# Patient Record
Sex: MALE | Race: BLACK OR AFRICAN AMERICAN | NOT HISPANIC OR LATINO | URBAN - METROPOLITAN AREA
[De-identification: names, ages, dates, MRNs, and addresses within clinical notes are randomized per-mention and may not be internally consistent; named-entity substitution may affect disease eponyms.]

---

## 2021-06-17 ENCOUNTER — LAB OUTSIDE AN ENCOUNTER (OUTPATIENT)
Dept: URBAN - METROPOLITAN AREA CLINIC 118 | Facility: CLINIC | Age: 51
End: 2021-06-17

## 2021-06-17 ENCOUNTER — OFFICE VISIT (OUTPATIENT)
Dept: URBAN - METROPOLITAN AREA CLINIC 118 | Facility: CLINIC | Age: 51
End: 2021-06-17
Payer: MEDICARE

## 2021-06-17 DIAGNOSIS — Z12.11 COLON CANCER SCREENING: ICD-10-CM

## 2021-06-17 PROCEDURE — G9903 PT SCRN TBCO ID AS NON USER: HCPCS | Performed by: INTERNAL MEDICINE

## 2021-06-17 PROCEDURE — 3017F COLORECTAL CA SCREEN DOC REV: CPT | Performed by: INTERNAL MEDICINE

## 2021-06-17 PROCEDURE — 99202 OFFICE O/P NEW SF 15 MIN: CPT | Performed by: INTERNAL MEDICINE

## 2021-06-17 PROCEDURE — G8427 DOCREV CUR MEDS BY ELIG CLIN: HCPCS | Performed by: INTERNAL MEDICINE

## 2021-06-17 NOTE — HPI-TODAY'S VISIT:
50 yo male with pmh of ESRD s/p kidney transplant here for evaluation for colonoscopy. Denies any GI complaints including abdominal pain, rectal bleeding, constipation, diarrhea. No prior colonoscopy.

## 2021-07-08 ENCOUNTER — OFFICE VISIT (OUTPATIENT)
Dept: URBAN - METROPOLITAN AREA SURGERY CENTER 23 | Facility: SURGERY CENTER | Age: 51
End: 2021-07-08
Payer: MEDICARE

## 2021-07-08 DIAGNOSIS — K63.5 BENIGN COLON POLYP: ICD-10-CM

## 2021-07-08 DIAGNOSIS — Z12.11 COLON CANCER SCREENING: ICD-10-CM

## 2021-07-08 PROCEDURE — G8907 PT DOC NO EVENTS ON DISCHARG: HCPCS | Performed by: INTERNAL MEDICINE

## 2021-07-08 PROCEDURE — 45380 COLONOSCOPY AND BIOPSY: CPT | Performed by: INTERNAL MEDICINE

## 2023-09-12 ENCOUNTER — CLAIMS CREATED FROM THE CLAIM WINDOW (OUTPATIENT)
Dept: URBAN - METROPOLITAN AREA MEDICAL CENTER 33 | Facility: MEDICAL CENTER | Age: 53
End: 2023-09-12
Payer: MEDICARE

## 2023-09-12 DIAGNOSIS — R79.89 ABNORMAL BILIRUBIN TEST: ICD-10-CM

## 2023-09-12 DIAGNOSIS — K83.8 OTHER SPECIFIED DISEASES OF BILIARY TRACT: ICD-10-CM

## 2023-09-12 DIAGNOSIS — R74.01 ABNORMAL/ELEVATED TRANSAMINASE (SGOT, AMINOTRANSFERASE): ICD-10-CM

## 2023-09-12 DIAGNOSIS — K82.8 ADENOMYOSIS OF GALLBLADDER: ICD-10-CM

## 2023-09-12 PROCEDURE — 99254 IP/OBS CNSLTJ NEW/EST MOD 60: CPT | Performed by: PHYSICIAN ASSISTANT

## 2023-09-12 PROCEDURE — 99254 IP/OBS CNSLTJ NEW/EST MOD 60: CPT | Performed by: INTERNAL MEDICINE

## 2023-09-12 PROCEDURE — G8427 DOCREV CUR MEDS BY ELIG CLIN: HCPCS | Performed by: INTERNAL MEDICINE

## 2023-09-12 PROCEDURE — 99222 1ST HOSP IP/OBS MODERATE 55: CPT | Performed by: INTERNAL MEDICINE

## 2023-09-13 ENCOUNTER — CLAIMS CREATED FROM THE CLAIM WINDOW (OUTPATIENT)
Dept: URBAN - METROPOLITAN AREA MEDICAL CENTER 33 | Facility: MEDICAL CENTER | Age: 53
End: 2023-09-13
Payer: MEDICARE

## 2023-09-13 DIAGNOSIS — K83.8 OTHER SPECIFIED DISEASES OF BILIARY TRACT: ICD-10-CM

## 2023-09-13 DIAGNOSIS — K82.8 ADENOMYOSIS OF GALLBLADDER: ICD-10-CM

## 2023-09-13 DIAGNOSIS — R79.89 ABNORMAL BILIRUBIN TEST: ICD-10-CM

## 2023-09-13 PROCEDURE — 99232 SBSQ HOSP IP/OBS MODERATE 35: CPT | Performed by: PHYSICIAN ASSISTANT

## 2023-09-13 PROCEDURE — 99232 SBSQ HOSP IP/OBS MODERATE 35: CPT | Performed by: INTERNAL MEDICINE

## 2023-09-14 ENCOUNTER — CLAIMS CREATED FROM THE CLAIM WINDOW (OUTPATIENT)
Dept: URBAN - METROPOLITAN AREA MEDICAL CENTER 33 | Facility: MEDICAL CENTER | Age: 53
End: 2023-09-14
Payer: MEDICARE

## 2023-09-14 DIAGNOSIS — R79.89 ABNORMAL BILIRUBIN TEST: ICD-10-CM

## 2023-09-14 DIAGNOSIS — R74.8 ABNORMAL ALKALINE PHOSPHATASE TEST: ICD-10-CM

## 2023-09-14 DIAGNOSIS — K83.8 OTHER SPECIFIED DISEASES OF BILIARY TRACT: ICD-10-CM

## 2023-09-14 DIAGNOSIS — K82.8 ADENOMYOSIS OF GALLBLADDER: ICD-10-CM

## 2023-09-14 PROCEDURE — 99233 SBSQ HOSP IP/OBS HIGH 50: CPT | Performed by: PHYSICIAN ASSISTANT

## 2023-09-14 PROCEDURE — 99233 SBSQ HOSP IP/OBS HIGH 50: CPT | Performed by: INTERNAL MEDICINE

## 2023-10-25 ENCOUNTER — LAB OUTSIDE AN ENCOUNTER (OUTPATIENT)
Dept: URBAN - METROPOLITAN AREA CLINIC 118 | Facility: CLINIC | Age: 53
End: 2023-10-25

## 2023-10-25 ENCOUNTER — OFFICE VISIT (OUTPATIENT)
Dept: URBAN - METROPOLITAN AREA CLINIC 118 | Facility: CLINIC | Age: 53
End: 2023-10-25
Payer: MEDICARE

## 2023-10-25 VITALS
TEMPERATURE: 98.6 F | SYSTOLIC BLOOD PRESSURE: 119 MMHG | HEART RATE: 81 BPM | DIASTOLIC BLOOD PRESSURE: 74 MMHG | WEIGHT: 176.8 LBS | HEIGHT: 71 IN

## 2023-10-25 DIAGNOSIS — D64.89 OTHER SPECIFIED ANEMIAS: ICD-10-CM

## 2023-10-25 DIAGNOSIS — R19.7 DIARRHEA OF PRESUMED INFECTIOUS ORIGIN: ICD-10-CM

## 2023-10-25 DIAGNOSIS — R17 ELEVATED BILIRUBIN: ICD-10-CM

## 2023-10-25 DIAGNOSIS — K62.5 RECTAL BLEEDING: ICD-10-CM

## 2023-10-25 PROCEDURE — 99214 OFFICE O/P EST MOD 30 MIN: CPT | Performed by: INTERNAL MEDICINE

## 2023-10-25 RX ORDER — PANTOPRAZOLE SODIUM 40 MG/1
1 TABLET TABLET, DELAYED RELEASE ORAL ONCE A DAY
Status: ACTIVE | COMMUNITY

## 2023-10-25 RX ORDER — ATORVASTATIN CALCIUM 10 MG/1
1 TABLET TABLET, FILM COATED ORAL ONCE A DAY
Status: ACTIVE | COMMUNITY

## 2023-10-25 RX ORDER — MYCOPHENOLATE MOFETIL 250 MG/1
1 CAPSULE CAPSULE ORAL TWICE A DAY
Status: ACTIVE | COMMUNITY

## 2023-10-25 RX ORDER — TACROLIMUS 1 MG/1
AS DIRECTED CAPSULE ORAL
Status: ACTIVE | COMMUNITY

## 2023-10-25 RX ORDER — METOPROLOL TARTRATE 25 MG/1
1 TABLET WITH FOOD TABLET, FILM COATED ORAL TWICE A DAY
Status: ACTIVE | COMMUNITY

## 2023-10-25 NOTE — PHYSICAL EXAM CONSTITUTIONAL:
HOB increased to semi-santa's position at this time. No complications noted to site and distal pulses remain palpable. Pt denies all complaints at this time. See flowsheets for further information. Writer will continue to monitor. normal, in no acute distress,  well developed, well nourished,  ambulating without difficulty,  normal communication ability , alert

## 2023-10-25 NOTE — HPI-TODAY'S VISIT:
This is a 52 yo male with pmh of ESRD s/p kidney transplant here for a hospital follow up visit.  Patient had recent admission in 9/2023 at [facility] for LUIS CARLOS and sepsis. Patient reports having medication interaction with COVID medication and his immunesuppressant. Ended up having to restart HD during admission. Also seen by GI service for elevated liver enzymes. work up including MRCP and assessment was that jaundice due to sepsis.   Patient had elevation liver enzymes mainly with T. bili at 13.8 with direct 8.2 which improved during the admission.  Patient had acute renal failure likely secondary to tacrolimus toxicity.  Patient has previous history of RCC status post right nephrectomy kidney transplant.Patient reports persistent diarrhea symptoms with multiple loose stools.  Has intermittent blood in his stool.  Also reports having [facility] this [facility].  Records not available at this time.  Of note patient had colonoscopy with me in 2021 showing one 4 mm descending colon polyp removed. Patient also has history of prostate cancer status post prostatectomy.   MRCP  IMPRESSION:1. Status post right nephrectomy with no masses or fluid collections inthe nephrectomy bed. Atrophic left kidney. Partially imaged right lowerquadrant transplant kidney grossly unremarkable.2. Sludge and/or small stones within the gallbladder. No specific MRevidence for cholecystitis. No choledocholithiasis demonstrated. No etiology for jaundice identified.

## 2023-10-30 LAB
% SATURATION: 33
ALBUMIN/GLOBULIN RATIO: 1.2
ALBUMIN: 4.2
ALKALINE PHOSPHATASE: 79
ALT (SGPT): 7
AST (SGOT): 12
BILIRUBIN, DIRECT: 0.6
BILIRUBIN, INDIRECT: 1.1
BILIRUBIN, TOTAL: 1.7
FERRITIN: 1366
GLOBULIN: 3.4
IRON BINDING CAPACITY: 282
IRON, TOTAL: 92
MITOCHONDRIAL AB SCREEN: NEGATIVE
PROTEIN, TOTAL: 7.6

## 2023-11-05 LAB
CALPROTECTIN, FECAL: 200
CAMPYLOBACTER GROUP: NOT DETECTED
NOROVIRUS GI/GII: NOT DETECTED
ROTAVIRUS A: NOT DETECTED
SALMONELLA SPECIES: NOT DETECTED
SHIGA TOXIN 1: NOT DETECTED
SHIGA TOXIN 2: NOT DETECTED
SHIGELLA SPECIES: NOT DETECTED
VIBRIO GROUP: NOT DETECTED
YERSINIA ENTEROCOLITICA: NOT DETECTED

## 2024-01-18 ENCOUNTER — OFFICE VISIT (OUTPATIENT)
Dept: URBAN - METROPOLITAN AREA MEDICAL CENTER 16 | Facility: MEDICAL CENTER | Age: 54
End: 2024-01-18
Payer: MEDICARE

## 2024-01-18 DIAGNOSIS — D64.89 ANEMIA DUE TO OTHER CAUSE: ICD-10-CM

## 2024-01-18 DIAGNOSIS — R19.7 ACUTE DIARRHEA: ICD-10-CM

## 2024-01-18 DIAGNOSIS — K31.89 ACHYLIA: ICD-10-CM

## 2024-01-18 DIAGNOSIS — K20.80 ABSCESS OF ESOPHAGUS: ICD-10-CM

## 2024-01-18 PROCEDURE — 43239 EGD BIOPSY SINGLE/MULTIPLE: CPT | Performed by: INTERNAL MEDICINE

## 2024-01-18 RX ORDER — MYCOPHENOLATE MOFETIL 250 MG/1
1 CAPSULE CAPSULE ORAL TWICE A DAY
Status: ACTIVE | COMMUNITY

## 2024-01-18 RX ORDER — PANTOPRAZOLE SODIUM 40 MG/1
1 TABLET TABLET, DELAYED RELEASE ORAL ONCE A DAY
Status: ACTIVE | COMMUNITY

## 2024-01-18 RX ORDER — METOPROLOL TARTRATE 25 MG/1
1 TABLET WITH FOOD TABLET, FILM COATED ORAL TWICE A DAY
Status: ACTIVE | COMMUNITY

## 2024-01-18 RX ORDER — TACROLIMUS 1 MG/1
AS DIRECTED CAPSULE ORAL
Status: ACTIVE | COMMUNITY

## 2024-01-18 RX ORDER — ATORVASTATIN CALCIUM 10 MG/1
1 TABLET TABLET, FILM COATED ORAL ONCE A DAY
Status: ACTIVE | COMMUNITY

## 2024-02-14 ENCOUNTER — OV EP (OUTPATIENT)
Dept: URBAN - METROPOLITAN AREA CLINIC 118 | Facility: CLINIC | Age: 54
End: 2024-02-14
Payer: MEDICARE

## 2024-02-14 VITALS
WEIGHT: 187.8 LBS | BODY MASS INDEX: 26.29 KG/M2 | HEART RATE: 76 BPM | HEIGHT: 71 IN | SYSTOLIC BLOOD PRESSURE: 128 MMHG | DIASTOLIC BLOOD PRESSURE: 76 MMHG | TEMPERATURE: 98.1 F

## 2024-02-14 DIAGNOSIS — K62.5 RECTAL BLEEDING: ICD-10-CM

## 2024-02-14 DIAGNOSIS — K21.9 ACID REFLUX: ICD-10-CM

## 2024-02-14 DIAGNOSIS — R19.7 ACUTE DIARRHEA: ICD-10-CM

## 2024-02-14 DIAGNOSIS — R17 ELEVATED BILIRUBIN: ICD-10-CM

## 2024-02-14 PROBLEM — 266433003: Status: ACTIVE | Noted: 2024-02-14

## 2024-02-14 PROCEDURE — 99214 OFFICE O/P EST MOD 30 MIN: CPT | Performed by: INTERNAL MEDICINE

## 2024-02-14 RX ORDER — METOPROLOL TARTRATE 25 MG/1
1 TABLET WITH FOOD TABLET, FILM COATED ORAL TWICE A DAY
Status: ACTIVE | COMMUNITY

## 2024-02-14 RX ORDER — PANTOPRAZOLE SODIUM 40 MG/1
1 TABLET TABLET, DELAYED RELEASE ORAL ONCE A DAY
Status: ACTIVE | COMMUNITY

## 2024-02-14 RX ORDER — ALLOPURINOL 100 MG/1
TABLET ORAL
Qty: 45 TABLET | Status: ACTIVE | COMMUNITY

## 2024-02-14 RX ORDER — ATORVASTATIN CALCIUM 10 MG/1
1 TABLET TABLET, FILM COATED ORAL ONCE A DAY
Status: ACTIVE | COMMUNITY

## 2024-02-14 RX ORDER — MYCOPHENOLATE MOFETIL 250 MG/1
1 CAPSULE CAPSULE ORAL TWICE A DAY
Status: ACTIVE | COMMUNITY

## 2024-02-14 RX ORDER — TACROLIMUS 1 MG/1
AS DIRECTED CAPSULE ORAL
Status: ACTIVE | COMMUNITY

## 2024-02-14 RX ORDER — PANTOPRAZOLE SODIUM 40 MG/1
1 TABLET TABLET, DELAYED RELEASE ORAL ONCE A DAY
Qty: 30 TABLET | Refills: 1

## 2024-02-14 RX ORDER — MAGNESIUM OXIDE 420 MG/1
TABLET ORAL
Qty: 400 TABLET | Status: ACTIVE | COMMUNITY

## 2024-02-14 NOTE — HPI-TODAY'S VISIT:
This is a 54-year-old male with history of ESRD status post kidney transplant here for follow-up visit.  Since last visit patient had EGD which showed mild LA grade a esophagitis at GE junction and gastritis.  Biopsy showed reactive gastropathy negative for H. pylori, junctional type mucosa with acute esophagitis.Patient reports persistent intermittent diarrhea symptoms with occasional fecal urgency but no GI bleeding symptoms.  States that he had [facility] in [facility].  Has not sure of the results or have any records at this time.Patient reports persistent acid reflux symptoms.  Takes pantoprazole as needed.   FINDINGS: 1.	There was mild LA grade A esophagitis at the GE junction.  Biopsies obtained.  Otherwise unremarkable esophagus exam.2.	Mild erythematous mucosa in the antrum and body along with a few nonbleeding superficial erosions in the antrum.  Biopsies obtained.3.	Normal duodenal exam.  Biopsies obtained.  ------------------------------ LOV 10/203 ----------------------------------------------  This is a 54 yo male with pmh of ESRD s/p kidney transplant here for a hospital follow up visit.  Patient had recent admission in 9/2023 at [facility] for LUIS CARLOS and sepsis. Patient reports having medication interaction with COVID medication and his immunesuppressant. Ended up having to restart HD during admission. Also seen by GI service for elevated liver enzymes. work up including MRCP and assessment was that jaundice due to sepsis.   Patient had elevation liver enzymes mainly with T. bili at 13.8 with direct 8.2 which improved during the admission.  Patient had acute renal failure likely secondary to tacrolimus toxicity.  Patient has previous history of RCC status post right nephrectomy kidney transplant.Patient reports persistent diarrhea symptoms with multiple loose stools.  Has intermittent blood in his stool.  Also reports having [facility] in [facility].  Records not available at this time.  Of note patient had colonoscopy with me in 2021 showing one 4 mm descending colon polyp removed. Patient also has history of prostate cancer status post prostatectomy.   MRCP  IMPRESSION:1. Status post right nephrectomy with no masses or fluid collections inthe nephrectomy bed. Atrophic left kidney. Partially imaged right lowerquadrant transplant kidney grossly unremarkable.2. Sludge and/or small stones within the gallbladder. No specific MRevidence for cholecystitis. No choledocholithiasis demonstrated. No etiology for jaundice identified.

## 2024-05-21 ENCOUNTER — DASHBOARD ENCOUNTERS (OUTPATIENT)
Age: 54
End: 2024-05-21

## 2024-05-21 ENCOUNTER — OFFICE VISIT (OUTPATIENT)
Dept: URBAN - METROPOLITAN AREA CLINIC 118 | Facility: CLINIC | Age: 54
End: 2024-05-21

## 2024-05-21 VITALS
WEIGHT: 192.6 LBS | DIASTOLIC BLOOD PRESSURE: 74 MMHG | SYSTOLIC BLOOD PRESSURE: 125 MMHG | BODY MASS INDEX: 26.96 KG/M2 | HEART RATE: 66 BPM | TEMPERATURE: 97.3 F | HEIGHT: 71 IN

## 2024-05-21 RX ORDER — MYCOPHENOLATE MOFETIL 250 MG/1
1 CAPSULE CAPSULE ORAL TWICE A DAY
Status: ACTIVE | COMMUNITY

## 2024-05-21 RX ORDER — METOPROLOL TARTRATE 25 MG/1
1 TABLET WITH FOOD TABLET, FILM COATED ORAL TWICE A DAY
Status: ACTIVE | COMMUNITY

## 2024-05-21 RX ORDER — ALLOPURINOL 100 MG/1
TABLET ORAL
Qty: 45 TABLET | Status: ACTIVE | COMMUNITY

## 2024-05-21 RX ORDER — MAGNESIUM OXIDE 420 MG/1
TABLET ORAL
Qty: 400 TABLET | Status: ACTIVE | COMMUNITY

## 2024-05-21 RX ORDER — ATORVASTATIN CALCIUM 10 MG/1
1 TABLET TABLET, FILM COATED ORAL ONCE A DAY
Status: ACTIVE | COMMUNITY

## 2024-05-21 RX ORDER — PANTOPRAZOLE SODIUM 40 MG/1
TAKE 1 TABLET BY MOUTH EVERY DAY TABLET, DELAYED RELEASE ORAL
Qty: 90 TABLET | Refills: 0 | Status: ACTIVE | COMMUNITY

## 2024-05-21 RX ORDER — TACROLIMUS 1 MG/1
AS DIRECTED CAPSULE ORAL
Status: ACTIVE | COMMUNITY

## 2024-06-06 ENCOUNTER — TELEPHONE ENCOUNTER (OUTPATIENT)
Dept: URBAN - METROPOLITAN AREA CLINIC 109 | Facility: CLINIC | Age: 54
End: 2024-06-06

## 2024-08-13 ENCOUNTER — OFFICE VISIT (OUTPATIENT)
Dept: URBAN - METROPOLITAN AREA CLINIC 118 | Facility: CLINIC | Age: 54
End: 2024-08-13

## 2024-08-14 ENCOUNTER — OFFICE VISIT (OUTPATIENT)
Dept: URBAN - METROPOLITAN AREA CLINIC 118 | Facility: CLINIC | Age: 54
End: 2024-08-14
Payer: MEDICARE

## 2024-08-14 VITALS
HEIGHT: 71 IN | BODY MASS INDEX: 26.6 KG/M2 | HEART RATE: 76 BPM | DIASTOLIC BLOOD PRESSURE: 75 MMHG | TEMPERATURE: 97 F | SYSTOLIC BLOOD PRESSURE: 129 MMHG | WEIGHT: 190 LBS

## 2024-08-14 DIAGNOSIS — K21.00 GASTROESOPHAGEAL REFLUX DISEASE WITH ESOPHAGITIS WITHOUT HEMORRHAGE: ICD-10-CM

## 2024-08-14 DIAGNOSIS — R19.7 DIARRHEA OF PRESUMED INFECTIOUS ORIGIN: ICD-10-CM

## 2024-08-14 PROCEDURE — 99213 OFFICE O/P EST LOW 20 MIN: CPT

## 2024-08-14 RX ORDER — MYCOPHENOLATE MOFETIL 250 MG/1
1 CAPSULE CAPSULE ORAL TWICE A DAY
Status: ACTIVE | COMMUNITY

## 2024-08-14 RX ORDER — ALLOPURINOL 100 MG/1
TABLET ORAL
Qty: 45 TABLET | Status: ACTIVE | COMMUNITY

## 2024-08-14 RX ORDER — ATORVASTATIN CALCIUM 10 MG/1
1 TABLET TABLET, FILM COATED ORAL ONCE A DAY
Status: ACTIVE | COMMUNITY

## 2024-08-14 RX ORDER — METOPROLOL TARTRATE 25 MG/1
1 TABLET WITH FOOD TABLET, FILM COATED ORAL TWICE A DAY
Status: ACTIVE | COMMUNITY

## 2024-08-14 RX ORDER — TACROLIMUS 1 MG/1
AS DIRECTED CAPSULE ORAL
Status: ACTIVE | COMMUNITY

## 2024-08-14 RX ORDER — PANTOPRAZOLE SODIUM 40 MG/1
1 TABLET TABLET, DELAYED RELEASE ORAL ONCE A DAY
Qty: 30 TABLET | Refills: 2

## 2024-08-14 RX ORDER — PANTOPRAZOLE SODIUM 40 MG/1
1 TABLET TABLET, DELAYED RELEASE ORAL ONCE A DAY
Qty: 30 TABLET | Refills: 2 | Status: ACTIVE | COMMUNITY

## 2024-08-14 NOTE — HPI-TODAY'S VISIT:
Patient is a 55 y/o M with PMH of kidney transplant, GERD, HLD, HTN, who presents for F/U of reflux and diarrhea. Reports significant improvement of symptoms. Formed BM 1-3x/day depending on what he eats. No longer taking Imodium. Denies diarrhea, abd pain, N/V, hematochezia, melena, unintentional wt loss. No NSAID use.  Also reports occasional reflux 2x/week. No other UGI sx. Taking PPI prn. Per patient, last [facility]. Unable to obtain copy of report.  On file last colonoscopy (2021) showed 4mm benign polyp in DC. Otherwise nml. 1/2024 Last EGD bxs- reactive gastropathy in gastric antrum, acute esophagitis at GEJ, gastric mucosa with chronic inflammation.

## 2024-11-15 ENCOUNTER — OFFICE VISIT (OUTPATIENT)
Dept: URBAN - METROPOLITAN AREA CLINIC 118 | Facility: CLINIC | Age: 54
End: 2024-11-15
Payer: MEDICARE

## 2024-11-15 VITALS
BODY MASS INDEX: 27.52 KG/M2 | DIASTOLIC BLOOD PRESSURE: 82 MMHG | TEMPERATURE: 97.3 F | SYSTOLIC BLOOD PRESSURE: 126 MMHG | HEART RATE: 74 BPM | WEIGHT: 196.6 LBS | HEIGHT: 71 IN

## 2024-11-15 DIAGNOSIS — K21.00 GASTROESOPHAGEAL REFLUX DISEASE WITH ESOPHAGITIS WITHOUT HEMORRHAGE: ICD-10-CM

## 2024-11-15 PROCEDURE — 99213 OFFICE O/P EST LOW 20 MIN: CPT

## 2024-11-15 RX ORDER — PANTOPRAZOLE SODIUM 40 MG/1
1 TABLET TABLET, DELAYED RELEASE ORAL ONCE A DAY
Qty: 30 TABLET | Refills: 2

## 2024-11-15 RX ORDER — ATORVASTATIN CALCIUM 10 MG/1
1 TABLET TABLET, FILM COATED ORAL ONCE A DAY
Status: ACTIVE | COMMUNITY

## 2024-11-15 RX ORDER — ALLOPURINOL 100 MG/1
TABLET ORAL
Qty: 45 TABLET | Status: ACTIVE | COMMUNITY

## 2024-11-15 RX ORDER — TACROLIMUS 1 MG/1
AS DIRECTED CAPSULE ORAL
Status: ACTIVE | COMMUNITY

## 2024-11-15 RX ORDER — PANTOPRAZOLE SODIUM 40 MG/1
1 TABLET TABLET, DELAYED RELEASE ORAL ONCE A DAY
Qty: 30 TABLET | Refills: 2 | Status: ACTIVE | COMMUNITY

## 2024-11-15 RX ORDER — MYCOPHENOLATE MOFETIL 250 MG/1
1 CAPSULE CAPSULE ORAL TWICE A DAY
Status: ACTIVE | COMMUNITY

## 2024-11-15 RX ORDER — METOPROLOL TARTRATE 25 MG/1
1 TABLET WITH FOOD TABLET, FILM COATED ORAL TWICE A DAY
Status: ACTIVE | COMMUNITY

## 2024-11-15 NOTE — HPI-TODAY'S VISIT:
Patient presents for follow up of GERD. Patient is doing well. Reports sxs are well-controlled on daily ppi therapy. He would like refill today. He has no other UGI or LGI concerns at this time.  - - - - - - - - - - - - - - - - - - - - - 8/14/24 Patient is a 53 y/o M with PMH of kidney transplant, GERD, HLD, HTN, who presents for F/U of reflux and diarrhea. Reports significant improvement of symptoms. Formed BM 1-3x/day depending on what he eats. No longer taking Imodium. Denies diarrhea, abd pain, N/V, hematochezia, melena, unintentional wt loss. No NSAID use.  Also reports occasional reflux 2x/week. No other UGI sx. Taking PPI prn. Per patient, last [facility]23 [facility]. Unable to obtain copy of report.  On file last colonoscopy (2021) showed 4mm benign polyp in DC. Otherwise nml. 1/2024 Last EGD bxs- reactive gastropathy in gastric antrum, acute esophagitis at GEJ, gastric mucosa with chronic inflammation.

## 2025-05-16 ENCOUNTER — OFFICE VISIT (OUTPATIENT)
Dept: URBAN - METROPOLITAN AREA CLINIC 118 | Facility: CLINIC | Age: 55
End: 2025-05-16